# Patient Record
Sex: FEMALE | Race: WHITE | Employment: FULL TIME | URBAN - METROPOLITAN AREA
[De-identification: names, ages, dates, MRNs, and addresses within clinical notes are randomized per-mention and may not be internally consistent; named-entity substitution may affect disease eponyms.]

---

## 2022-01-12 ENCOUNTER — APPOINTMENT (OUTPATIENT)
Dept: GENERAL RADIOLOGY | Age: 37
End: 2022-01-12
Payer: COMMERCIAL

## 2022-01-12 ENCOUNTER — HOSPITAL ENCOUNTER (EMERGENCY)
Age: 37
Discharge: HOME | End: 2022-01-12
Attending: EMERGENCY MEDICINE
Payer: COMMERCIAL

## 2022-01-12 VITALS
HEART RATE: 71 BPM | SYSTOLIC BLOOD PRESSURE: 137 MMHG | RESPIRATION RATE: 16 BRPM | DIASTOLIC BLOOD PRESSURE: 74 MMHG | TEMPERATURE: 97.8 F | OXYGEN SATURATION: 99 %

## 2022-01-12 DIAGNOSIS — S82.65XA CLOSED NONDISPLACED FRACTURE OF LATERAL MALLEOLUS OF LEFT FIBULA, INITIAL ENCOUNTER: Primary | ICD-10-CM

## 2022-01-12 PROCEDURE — 73610 X-RAY EXAM OF ANKLE: CPT

## 2022-01-12 PROCEDURE — 6370000000 HC RX 637 (ALT 250 FOR IP): Performed by: EMERGENCY MEDICINE

## 2022-01-12 PROCEDURE — 99283 EMERGENCY DEPT VISIT LOW MDM: CPT

## 2022-01-12 PROCEDURE — 29515 APPLICATION SHORT LEG SPLINT: CPT

## 2022-01-12 RX ORDER — HYDROCODONE BITARTRATE AND ACETAMINOPHEN 5; 325 MG/1; MG/1
1 TABLET ORAL EVERY 6 HOURS PRN
Qty: 12 TABLET | Refills: 0 | Status: SHIPPED | OUTPATIENT
Start: 2022-01-12 | End: 2022-01-15

## 2022-01-12 RX ORDER — ACETAMINOPHEN 500 MG
1000 TABLET ORAL ONCE
Status: COMPLETED | OUTPATIENT
Start: 2022-01-12 | End: 2022-01-12

## 2022-01-12 RX ADMIN — ACETAMINOPHEN 1000 MG: 500 TABLET ORAL at 15:18

## 2022-01-12 ASSESSMENT — PAIN DESCRIPTION - ORIENTATION: ORIENTATION: LEFT

## 2022-01-12 ASSESSMENT — PAIN DESCRIPTION - LOCATION: LOCATION: ANKLE

## 2022-01-12 ASSESSMENT — PAIN SCALES - GENERAL: PAINLEVEL_OUTOF10: 7

## 2022-01-12 NOTE — ED PROVIDER NOTES
Triage Chief Complaint:   Ankle Pain (Pt was walking, stopped to turn and change direction and felt a \"pop\" to left ankle. Swelling noted to left lateral portion of ankle, throbbing and numb throughout foot. Pain 7/10 at this time. Pt is approx 6 months pregnant. Arrived to room via wheelchair, AOx4, breathing unlabored, skin warm and dry)    [place]:  Barrett Ramirez is a 31 y.o. female that presents with with left ankle pain. Patient was walking and stopped and pivoted to change direction and felt a \"pop\" with immediate pain to the lateral aspect of her left ankle. Patient then began to have some swelling and numbness to the same area. Patient reports pain is 7 out of 10 at this time and more severe with attempting to bear weight. No radiation up the leg into the calf. Patient is stable to wiggle toes albeit with some pain. No fall or head injury. No injury to any other location. Patient is approximately 6 months pregnant with pregnancy going well. ROS:  General:  No fevers, no chills  Respiratory:  No shortness of breath  Neurologic:  No numbness, no weakness  Extremities:  + edema, + pain  Skin:  No rash  Psych: No axienty    No past medical history on file. Past Surgical History:   Procedure Laterality Date    CHOLECYSTECTOMY       No family history on file.   Social History     Socioeconomic History    Marital status: Single     Spouse name: Not on file    Number of children: Not on file    Years of education: Not on file    Highest education level: Not on file   Occupational History    Not on file   Tobacco Use    Smoking status: Not on file    Smokeless tobacco: Not on file   Substance and Sexual Activity    Alcohol use: Not on file    Drug use: Not on file    Sexual activity: Not on file   Other Topics Concern    Not on file   Social History Narrative    Not on file     Social Determinants of Health     Financial Resource Strain:     Difficulty of Paying Living Expenses: Not on file   Food Insecurity:     Worried About Running Out of Food in the Last Year: Not on file    Shaye of Food in the Last Year: Not on file   Transportation Needs:     Lack of Transportation (Medical): Not on file    Lack of Transportation (Non-Medical): Not on file   Physical Activity:     Days of Exercise per Week: Not on file    Minutes of Exercise per Session: Not on file   Stress:     Feeling of Stress : Not on file   Social Connections:     Frequency of Communication with Friends and Family: Not on file    Frequency of Social Gatherings with Friends and Family: Not on file    Attends Episcopal Services: Not on file    Active Member of [address] [place] [address] [employer] or Organizations: Not on file    Attends Club or Organization Meetings: Not on file    Marital Status: Not on file   Intimate Partner Violence:     Fear of Current or Ex-Partner: Not on file    Emotionally Abused: Not on file    Physically Abused: Not on file    Sexually Abused: Not on file   Housing Stability:     Unable to Pay for Housing in the Last Year: Not on file    Number of [place] in the Last Year: Not on file    Unstable Housing in the Last Year: Not on file     No current facility-administered medications for this encounter. Current Outpatient Medications   Medication Sig Dispense Refill    Prenatal MV-Min-Fe Fum-FA-DHA (PRENATAL 1 PO) Take by mouth      HYDROcodone-acetaminophen (NORCO) 5-325 MG per tablet Take 1 tablet by mouth every 6 hours as needed for Pain for up to 3 days. Intended supply: 3 days. Take lowest dose possible to manage pain 12 tablet 0     No Known Allergies    Nursing Notes Reviewed    Physical Exam:  ED Triage Vitals [01/12/22 1500]   Enc Vitals Group      /74      Pulse 71      Resp 16      Temp 97.8 °F (36.6 °C)      Temp Source Infrared      SpO2 99 %      Weight       Height       Head Circumference       Peak Flow       Pain Score       Pain Loc       Pain Edu? Excl. in [address]?         My pulse ox interpretation is - normal    General appearance:  No acute distress. Sitting comfortably in bed. Skin:  Warm. Dry. No open wounds or sores to the affected ankle. Some developing contusion to the lateral left ankle. Eye:  Extraocular movements intact. Ears, nose, mouth and throat:  Oral mucosa moist   Extremity:  No swelling other than the left ankle. Normal ROM of the left ankle. LLE: No gross deformity of the left lower extremity with obvious edema and swelling about the lateral aspect of the left ankle just below the lateral malleolus. Normal range of motion of left hip and left knee. Limited range of motion of left ankle secondary to pain. Wiggles toes without difficulty. Extensor mechanism is intact. Achilles is intact. No pain with calf squeezing or tenderness at the fibular head or with patellar blotting. No tenderness palpation along the medial malleolus or the base of the fifth metatarsal.  Strong DP and PT pulses. Left foot is well-perfused. There are some altered sensation to light touch to the lateral left foot where it is most swollen but patient is able to feel normal light touch to all toes. Heart:  Strong and symmetric peripheral pulses. Extremities are well perfused. Abdomen:  Non-distended. Respiratory:  Respirations nonlabored. Neurological:  Alert and oriented times 3. No focal neuro deficits. Sensation intact to light touch to distal upper/lower extremities; 5/5 and symmetric  and dorsi/plantar flexion. I have reviewed and interpreted all of the currently available lab results from this visit (if applicable):  No results found for this visit on 01/12/22. Radiographs (if obtained):  [] The following radiograph was interpreted by myself in the absence of a radiologist:   [x] Radiologist's Report Reviewed:  XR ANKLE LEFT (MIN 3 VIEWS)   Final Result   Transversely oriented minimally displaced fracture of the tip of the lateral   malleolus. EKG (if obtained): (All EKG's are interpreted by myself in the absence of a cardiologist)    Chart review shows recent radiographs:  No results found. MDM:  Pt presents as above. Emergent conditions considered. Presentation prompted initial x-rays as above. X-rays are demonstrating transversely oriented minimally displaced fracture of the tip of the lateral malleolus. Patient is placed in a short leg splint with footplate. After risk-benefit conversation patient will be provided a short course of Norco to be used only when pain is more severe. Scheduled Tylenol otherwise for pain. Orthopedic follow-up is discussed and patient is planning on calling to setting that up. We unfortunately do not have any crutches and I did encourage patient to call around to various pharmacies and friends to see who might have a pair to be used as it would like patient to be nonweightbearing to this leg. Splint care is discussed. RICE discussed. I discussed specific signs and symptoms and when to return to the emergency department as well as need for close outpatient follow-up. Questions sought and answered with the patient and significant other. They voice understanding and agree with plan. Care of this patient did occur during the COVID-19 pandemic. Clinical Impression:  1. Closed nondisplaced fracture of lateral malleolus of left fibula, initial encounter      Disposition referral (if applicable):  DO Natali Benson 3968 [phone]    Schedule an appointment as soon as possible for a visit       Your Orthopedic Surgeon    Schedule an appointment as soon as possible for a visit       Disposition medications (if applicable):  Discharge Medication List as of 1/12/2022  4:06 PM      START taking these medications    Details   HYDROcodone-acetaminophen (NORCO) 5-325 MG per tablet Take 1 tablet by mouth every 6 hours as needed for Pain for up to 3 days.  Intended supply: 3 days. Take lowest dose possible to manage pain, Disp-12 tablet, R-0Normal             Comment: Please note this report has been produced using speech recognition software and may contain errors related to that system including errors in grammar, punctuation, and spelling, as well as words and phrases that may be inappropriate. If there are any questions or concerns please feel free to contact the dictating provider for clarification.          Xenia Mckenna MD  01/12/22 2654

## 2022-01-18 ENCOUNTER — OFFICE VISIT (OUTPATIENT)
Dept: ORTHOPEDIC SURGERY | Age: 37
End: 2022-01-18
Payer: COMMERCIAL

## 2022-01-18 VITALS
OXYGEN SATURATION: 98 % | HEIGHT: 60 IN | WEIGHT: 220 LBS | RESPIRATION RATE: 16 BRPM | HEART RATE: 101 BPM | BODY MASS INDEX: 43.19 KG/M2

## 2022-01-18 DIAGNOSIS — S82.839A AVULSION FRACTURE OF DISTAL END OF FIBULA: Primary | ICD-10-CM

## 2022-01-18 PROCEDURE — G8419 CALC BMI OUT NRM PARAM NOF/U: HCPCS | Performed by: STUDENT IN AN ORGANIZED HEALTH CARE EDUCATION/TRAINING PROGRAM

## 2022-01-18 PROCEDURE — 4004F PT TOBACCO SCREEN RCVD TLK: CPT | Performed by: STUDENT IN AN ORGANIZED HEALTH CARE EDUCATION/TRAINING PROGRAM

## 2022-01-18 PROCEDURE — G8427 DOCREV CUR MEDS BY ELIG CLIN: HCPCS | Performed by: STUDENT IN AN ORGANIZED HEALTH CARE EDUCATION/TRAINING PROGRAM

## 2022-01-18 PROCEDURE — G8484 FLU IMMUNIZE NO ADMIN: HCPCS | Performed by: STUDENT IN AN ORGANIZED HEALTH CARE EDUCATION/TRAINING PROGRAM

## 2022-01-18 PROCEDURE — 99203 OFFICE O/P NEW LOW 30 MIN: CPT | Performed by: STUDENT IN AN ORGANIZED HEALTH CARE EDUCATION/TRAINING PROGRAM

## 2022-01-18 ASSESSMENT — ENCOUNTER SYMPTOMS
WHEEZING: 0
COLOR CHANGE: 1
NAUSEA: 0
SHORTNESS OF BREATH: 0
SORE THROAT: 0
COUGH: 0
BACK PAIN: 0
VOICE CHANGE: 0
VOMITING: 0

## 2022-01-18 NOTE — PROGRESS NOTES
1/18/2022   Chief Complaint   Patient presents with    Fracture     left distal fibula        History of Present Illness:                             Ramon Barrera is a 31 y.o. female  works at a car dealership,  referred by emergency room for evaluation and treatment of left ankle pain. Patient states that she was ambulating approximately 6 days prior when she stopped to turn and felt a pop on the lateral aspect of her left ankle. She had immediate pain and this caused her to fall. She was able to ambulate although this was extremely painful. She was brought to the emergency room to be evaluated. X-rays were taken at that time which demonstrated a minimally displaced avulsion fracture at the tip of the distal fibula. She was placed in a splint and made nonweightbearing and told to follow-up with orthopedics. She is here today for first visit with myself. She denies any previous left ankle pain. Patient is 6 months pregnant at this time. She has no other areas of complaint currently. The pain's location is ATFL origin of fibula tip. she describes the symptoms as aching, sharp and stabbing. Symptoms improve with rest. Symptoms worsen with ankle plantarflexion/dorsiflexion or inversion, weight bearing (especially stair climbing), twisting activities. Patient states she denies having sensation of instability, with minimally decreased ROM    Treatment to date has been ice, NSAID, splinting with mild relief. Patient denies prior injury to ankle, denies numbness, tingling, fever, chills. Is affecting ADLs. Pain is 5/10 at it's worst.    Patient has been nonweightbearing and using a knee scooter to help with this. Outside reports reviewed: Emergency room note and imaging reviewed    Patient's medications, allergies, past medical, surgical, social and family histories were reviewed and updated as appropriate. MA HPI: Patient is a 31year old female.  Patient is in the office today with left distal fibula. Patient states that he/she injured themselves by loosing her balance while walking and turning and fell. Patient states that the injury happened 1/12/22. She was seen in the ED on 1/12/22. She came in wearing the splint from the ED and using a knee scooter. She has been taking tylenol for pain. She is pregnant. Pain scale  5/10. Occupation: car dealership      Medical History  Patient's medications, allergies, past medical, surgical, social and family histories were reviewed and updated as appropriate. History reviewed. No pertinent past medical history. Past Surgical History:   Procedure Laterality Date    CHOLECYSTECTOMY       History reviewed. No pertinent family history. Social History     Socioeconomic History    Marital status: Single     Spouse name: None    Number of children: None    Years of education: None    Highest education level: None   Occupational History    None   Tobacco Use    Smoking status: None    Smokeless tobacco: None   Substance and Sexual Activity    Alcohol use: None    Drug use: None    Sexual activity: None   Other Topics Concern    None   Social History Narrative    None     Social Determinants of Health     Financial Resource Strain:     Difficulty of Paying Living Expenses: Not on file   Food Insecurity:     Worried About Running Out of Food in the Last Year: Not on file    Shaye of Food in the Last Year: Not on file   Transportation Needs:     Lack of Transportation (Medical): Not on file    Lack of Transportation (Non-Medical):  Not on file   Physical Activity:     Days of Exercise per Week: Not on file    Minutes of Exercise per Session: Not on file   Stress:     Feeling of Stress : Not on file   Social Connections:     Frequency of Communication with Friends and Family: Not on file    Frequency of Social Gatherings with Friends and Family: Not on file    Attends Orthodoxy Services: Not on file   [employer] or Organizations: Not on file    Attends Club or Organization Meetings: Not on file    Marital Status: Not on file   Intimate Partner Violence:     Fear of Current or Ex-Partner: Not on file    Emotionally Abused: Not on file    Physically Abused: Not on file    Sexually Abused: Not on file   Housing Stability:     Unable to Pay for Housing in the Last Year: Not on file    Number of [place] in the Last Year: Not on file    Unstable Housing in the Last Year: Not on file     Current Outpatient Medications   Medication Sig Dispense Refill    Prenatal MV-Min-Fe Fum-FA-DHA (PRENATAL 1 PO) Take by mouth       No current facility-administered medications for this visit. No Known Allergies      Review of Systems   Constitutional: Positive for activity change. Negative for fatigue and fever. HENT: Negative for sneezing, sore throat and voice change. Respiratory: Negative for cough, shortness of breath and wheezing. Cardiovascular: Negative for leg swelling. Gastrointestinal: Negative for nausea and vomiting. Musculoskeletal: Positive for gait problem, joint swelling and myalgias. Negative for arthralgias, back pain, neck pain and neck stiffness. Skin: Positive for color change. Negative for rash and wound. Neurological: Negative for weakness and numbness. Psychiatric/Behavioral: Negative for behavioral problems, confusion and self-injury. Examination:  General Exam:  Vitals: LMP  (LMP Unknown)    Physical Exam  Constitutional:       General: She is not in acute distress. Appearance: Normal appearance. She is not ill-appearing. Eyes:      General:         Right eye: No discharge. Left eye: No discharge. Extraocular Movements: Extraocular movements intact. Cardiovascular:      Rate and Rhythm: Normal rate. Pulmonary:      Effort: Pulmonary effort is normal. No respiratory distress. Breath sounds: No wheezing. Musculoskeletal:         General: Swelling, tenderness and signs of injury present. No deformity. Right knee: Normal.      Left knee: Normal.      Right lower leg: Normal. No edema. Left lower leg: Normal. No edema. Right ankle: Normal.      Right Achilles Tendon: Normal.      Left ankle: Swelling and ecchymosis present. No deformity or lacerations. Tenderness present over the lateral malleolus, ATF ligament and CF ligament. No medial malleolus tenderness. Decreased range of motion. Anterior drawer test negative. Normal pulse. Left Achilles Tendon: Normal.      Right foot: Normal.      Left foot: Normal.   Skin:     General: Skin is warm and dry. Capillary Refill: Capillary refill takes less than 2 seconds. Neurological:      General: No focal deficit present. Mental Status: She is alert and oriented to person, place, and time. Sensory: No sensory deficit. Coordination: Coordination normal.      Gait: Gait normal.   Psychiatric:         Mood and Affect: Mood normal.         Behavior: Behavior normal.        LEFT Foot and Ankle Exam      INSPECTION: ALIGNMENT:    Gait: Not tested at this time     Alignment:     Hindfoot: Normal       Midfoot: Normal     Forefoot: Normal    Scars: None   Color: Minimal ecchymosis over distal fibula    Swelling: Mild over distal fibula    Atrophy: None Collective     Heel / Toe Walking: Unable to perform at this time    Ankle-Hindfoot Alignment:    Good -plantigrade (PG), well aligned      TENDERNESS:    Sinus tarsi: None   Anteromedial joint line: none    Syndesmosis: none   Anterolateral joint line: none    ATFL: +   PTFL: Neg      CFL: +       Talonavicular: none     Anterior tibialis: none    Anterolateral gutter: none  Extensor tendons: none    Fibula: Pain distally only    Peroneal tendons: none     Peroneal tubercle.  None     Medial/lateral achilles: none    Medial/lateral achilles insertion: none      Deltoid: none        Medial fracture    P:   I had a thorough discussion with the patient regarding her left ankle injury and treatment course. I explained that although that she does have a fracture this is more of a avulsion type injury and we will treat this more as a ankle sprain. Patient was placed in a walking boot and at this time can be weightbearing as tolerated as long as she remains nonpainful. We will start her off at 50% weightbearing and she can increase her weightbearing 25% each day if she remains nonpainful. If she does have pain with increased weightbearing she will maintain her level for the day and will not progress and will try again the following day. She should continue using the knee scooter as needed as well. She will be off work for 1 week and a note was provided. She will continue to ice and use over-the-counter pain medication although this may be difficult with her being pregnant. She will do no range of motion exercises at this time but we will likely begin these at her follow-up visit in 1 month. We will likely not do any type of imaging at that time unless patient has a new injury, due to the fact the patient is pregnant. At her next visit we will attempt to progress her to a lace up ankle brace. All questions were answered and patient voiced understanding.     Electronically signed by Grecia Knott DO on 1/18/2022 at 8:52 AM

## 2022-01-18 NOTE — PATIENT INSTRUCTIONS
Cam boot given in office  Please wear cam boot when you are up and walking  May take the boot of for range of motion, hygiene, and rest/sleep  Return to work to in one week with restrictions of no stairs, may take breaks as needed, and may use assist walking devices  May start to progress weightbearing as tolerated  Continue range of motion  Ice and elevate as needed for pain and swelling  Tylenol or Motrin for pain  Follow up in one month

## 2022-01-18 NOTE — PROGRESS NOTES
Patient is a 31year old female. Patient is in the office today with left distal fibula. Patient states that he/she injured themselves by loosing her balance while walking and turning and fell. Patient states that the injury happened 1/12/22. She was seen in the ED on 1/12/22. She came in wearing the splint from the ED and using a knee scooter. She has been taking tylenol for pain. She is pregnant. Pain scale  5/10.   Occupation: car dealership  Dominant Hand: n/a

## 2022-01-21 NOTE — LETTER
Jennifer WILEY [place] discharged to home accompanied by family member.   Patient provided with the following educational materials upon discharge:  [facility] .   Valuables and belongings sent with patient.   discharge summary, discharge instructions, medications and follow up appointments reviewed with patient. Patient and  verbalized understanding   [facility]  [address] [facility] [address]  Phone: [phone]  Fax: [phone]    Grecia Knott DO        January 18, 2022     Patient: Lani Bates   YOB: 1985   Date of Visit: 1/18/2022       To Whom it May Concern:    Lani Bates was seen in my clinic on 1/18/2022. She may return to work on 01/25/2022 with restrictions of no stairs, may take breaks as needed, and may use assist walking devices while ambulating. If you have any questions or concerns, please don't hesitate to call.     Sincerely,         Grecia Knott DO

## 2022-02-21 ENCOUNTER — HOSPITAL ENCOUNTER (OUTPATIENT)
Dept: GENERAL RADIOLOGY | Age: 37
Discharge: HOME | End: 2022-02-21
Payer: COMMERCIAL

## 2022-02-21 ENCOUNTER — HOSPITAL ENCOUNTER (OUTPATIENT)
Age: 37
Discharge: HOME | End: 2022-02-21
Payer: COMMERCIAL

## 2022-02-21 DIAGNOSIS — M25.572 LEFT ANKLE PAIN, UNSPECIFIED CHRONICITY: ICD-10-CM

## 2022-02-21 PROCEDURE — 73610 X-RAY EXAM OF ANKLE: CPT

## 2022-02-22 ENCOUNTER — OFFICE VISIT (OUTPATIENT)
Dept: ORTHOPEDIC SURGERY | Age: 37
End: 2022-02-22
Payer: COMMERCIAL

## 2022-02-22 VITALS
BODY MASS INDEX: 44.57 KG/M2 | HEIGHT: 60 IN | HEART RATE: 82 BPM | WEIGHT: 227 LBS | OXYGEN SATURATION: 97 % | RESPIRATION RATE: 16 BRPM

## 2022-02-22 DIAGNOSIS — S82.839A AVULSION FRACTURE OF DISTAL END OF FIBULA: Primary | ICD-10-CM

## 2022-02-22 PROCEDURE — G8419 CALC BMI OUT NRM PARAM NOF/U: HCPCS | Performed by: STUDENT IN AN ORGANIZED HEALTH CARE EDUCATION/TRAINING PROGRAM

## 2022-02-22 PROCEDURE — 99213 OFFICE O/P EST LOW 20 MIN: CPT | Performed by: STUDENT IN AN ORGANIZED HEALTH CARE EDUCATION/TRAINING PROGRAM

## 2022-02-22 PROCEDURE — G8484 FLU IMMUNIZE NO ADMIN: HCPCS | Performed by: STUDENT IN AN ORGANIZED HEALTH CARE EDUCATION/TRAINING PROGRAM

## 2022-02-22 PROCEDURE — 4004F PT TOBACCO SCREEN RCVD TLK: CPT | Performed by: STUDENT IN AN ORGANIZED HEALTH CARE EDUCATION/TRAINING PROGRAM

## 2022-02-22 PROCEDURE — G8428 CUR MEDS NOT DOCUMENT: HCPCS | Performed by: STUDENT IN AN ORGANIZED HEALTH CARE EDUCATION/TRAINING PROGRAM

## 2022-02-22 ASSESSMENT — ENCOUNTER SYMPTOMS
BACK PAIN: 0
SORE THROAT: 0
VOICE CHANGE: 0
COLOR CHANGE: 0
COUGH: 0
VOMITING: 0
SHORTNESS OF BREATH: 0
NAUSEA: 0
WHEEZING: 0

## 2022-02-22 NOTE — PROGRESS NOTES
Pt comes in today for a follow up of a left lateral malleolus fx. She was last seen in our office on 1/18/22. She is currently ambulating without the boot. She states that she is able to complete her ADLs without pain. She does state that she feels unstable sometimes, like \"it wants to roll again\". She rates her pain at 0/10 currently. She has been doing ROM exercises. X-rays were taken on 1/21/22.

## 2022-02-22 NOTE — PATIENT INSTRUCTIONS
Ankle exercises given today  Follow up as needed. Patient Education        Ankle Fracture: Rehab Exercises  Introduction  Here are some examples of exercises for you to try. The exercises may be suggested for a condition or for rehabilitation. Start each exercise slowly. Ease off the exercises if you start to have pain. You will be told when to start these exercises and which ones will work best for you. How to do the exercises  Calf stretch (knee straight)    For this exercise, you will need a towel. 1. Sit with your affected leg straight and supported on the floor. Your other leg should be bent, with that foot flat on the floor. 2. Place a towel around your affected foot just under the toes. 3. Hold one end of the towel in each hand, with your hands above your knees. 4. Pull back gently with the towel so that your foot stretches toward you. 5. Hold the position for at least 15 to 30 seconds. 6. Repeat 2 to 4 times a session, up to 5 sessions a day. Calf stretch (knee bent)    For this exercise, you will need a towel. You will also need a pillow or foam roll. 1. Sit with your affected leg straight and supported on the floor. Your other leg should be bent, with that foot flat on the floor. 2. Place a pillow or foam roll under your affected leg. 3. Place a towel around your affected foot just under the toes. 4. Hold one end of the towel in each hand, with your hands above your knees. 5. Pull back gently with the towel so that your foot stretches toward you. 6. Hold the position for at least 15 to 30 seconds. 7. Repeat 2 to 4 times a session, up to 5 sessions a day. Ankle plantar flexion    1. Sit with your affected leg straight and supported on the floor. Your other leg should be bent, with that foot flat on the floor. 2. Keeping your affected leg straight, gently flex your foot downward so your toes are pointed away from your body.  Then slowly relax your foot to the starting position. 3. Repeat 8 to 12 times. Ankle dorsiflexion    1. Sit with your affected leg straight and supported on the floor. Your other leg should be bent, with that foot flat on the floor. 2. Keeping your affected leg straight, gently flex your foot back toward your body so your toes point upward. Then slowly relax your foot to the starting position. 3. Repeat 8 to 12 times. Resisted ankle plantar flexion    For the next four exercises, you will need elastic exercise material, such as surgical tubing or Thera-Band. 1. Sit with your affected leg straight and supported on the floor. Your other leg should be bent, with that foot flat on the floor. 2. Place an elastic band around your affected foot just under the toes. 3. Hold each end of the band in each hand, with your hands above your knees. 4. Keeping your affected leg straight, gently flex your foot downward so your toes are pointed away from your body. Then slowly relax your foot to the starting position. 5. Repeat 8 to 12 times. Resisted ankle dorsiflexion    1. Tie the ends of an exercise band together to form a loop. Attach one end of the loop to a secure object, like a table leg, or shut a door on it to hold it in place. (Or you can have someone hold one end of the loop to provide resistance.)  2. While sitting on the floor or in a chair, loop the other end of the band over the top of your affected foot. 3. Keeping your knee and leg straight, slowly flex your foot toward you to pull back on the exercise band, and then slowly relax. 4. Repeat 8 to 12 times. Resisted ankle inversion    1. Sit on the floor with your good leg crossed over your other leg. 2. Hold both ends of an exercise band and loop the band around the inside of your affected foot. Then press your good foot against the band. 3. Keeping your legs crossed, slowly push your affected foot against the band so that foot moves away from your good foot. Then slowly relax.   4. Repeat 8 to 12 times. Resisted ankle eversion    1. Sit on the floor with your legs straight. 2. Hold both ends of an exercise band and loop the band around the outside of your affected foot. Then press your good foot against the band. 3. Keeping your leg straight, slowly push your affected foot outward against the band and away from your good foot without letting your leg rotate. Then slowly relax. 4. Repeat 8 to 12 times. Ankle alphabet    1. Sit in a chair with your feet flat on the floor. (You can also do this exercise lying on your back with your affected leg propped up on a pillow). 2. Lift the heel of your affected foot off the floor, and slowly trace the letters of the alphabet. Heel raises    1. Stand with your feet a few inches apart, with your hands lightly resting on a counter or chair in front of you. 2. Slowly raise your heels off the floor while keeping your knees straight. 3. Hold for about 6 seconds, then slowly lower your heels to the floor. 4. Do 8 to 12 repetitions several times during the day. Follow-up care is a key part of your treatment and safety. Be sure to make and go to all appointments, and call your doctor if you are having problems. It's also a good idea to know your test results and keep a list of the medicines you take. Where can you learn more? Go to https://Trunk ClubpepicQUIQeb.[email or web address]. org and sign in to your [employer] account. Enter T800 in the [facility] box to learn more about \"Ankle Fracture: Rehab Exercises. \"     If you do not have an account, please click on the \"Sign Up Now\" link. Current as of: July 1, 2021               Content Version: 13.1  © 2006-2021 Healthwise, Incorporated. Care instructions adapted under license by [facility] ([facility]). If you have questions about a medical condition or this instruction, always ask your healthcare professional. [address] any warranty or liability for your use of this information.     Patient Education

## 2022-02-22 NOTE — PROGRESS NOTES
2/22/2022   Chief Complaint   Patient presents with    Fracture     left lateral malleolus      Updated HPI: Patient is here for reevaluation of her left ankle. Patient states that her pain is significantly improved and she did use the walking boot for several weeks without issue. She has no complaints at this time and states she completes all of her ADLs without issue or pain. However she still states that she has some sensation of decreased strength and sensation of the ankle wanting to invert. No new complaints this time. Previoius HPI (1/18/2022):                             Helen Tyler is a 31 y.o. female  works at a car dealership,  referred by emergency room for evaluation and treatment of left ankle pain. Patient states that she was ambulating approximately 6 days prior when she stopped to turn and felt a pop on the lateral aspect of her left ankle. She had immediate pain and this caused her to fall. She was able to ambulate although this was extremely painful. She was brought to the emergency room to be evaluated. X-rays were taken at that time which demonstrated a minimally displaced avulsion fracture at the tip of the distal fibula. She was placed in a splint and made nonweightbearing and told to follow-up with orthopedics. She is here today for first visit with myself. She denies any previous left ankle pain. Patient is 6 months pregnant at this time. She has no other areas of complaint currently. The pain's location is ATFL origin of fibula tip. she describes the symptoms as aching, sharp and stabbing. Symptoms improve with rest. Symptoms worsen with ankle plantarflexion/dorsiflexion or inversion, weight bearing (especially stair climbing), twisting activities. Patient states she denies having sensation of instability, with minimally decreased ROM    Treatment to date has been ice, NSAID, splinting with mild relief.       Patient denies prior injury to ankle, denies numbness, tingling, fever, chills. Is affecting ADLs. Pain is 5/10 at it's worst.    Patient has been nonweightbearing and using a knee scooter to help with this. Outside reports reviewed: Emergency room note and imaging reviewed    Patient's medications, allergies, past medical, surgical, social and family histories were reviewed and updated as appropriate. MA HPI: Patient is a 31year old female. Patient is in the office today with left distal fibula. Patient states that he/she injured themselves by loosing her balance while walking and turning and fell. Patient states that the injury happened 1/12/22. She was seen in the ED on 1/12/22. She came in wearing the splint from the ED and using a knee scooter. She has been taking tylenol for pain. She is pregnant. Pain scale  5/10. Occupation: car dealership      Medical History  Patient's medications, allergies, past medical, surgical, social and family histories were reviewed and updated as appropriate. No past medical history on file. Past Surgical History:   Procedure Laterality Date    CHOLECYSTECTOMY       No family history on file.   Social History     Socioeconomic History    Marital status: Single     Spouse name: Not on file    Number of children: Not on file    Years of education: Not on file    Highest education level: Not on file   Occupational History    Not on file   Tobacco Use    Smoking status: Not on file    Smokeless tobacco: Not on file   Substance and Sexual Activity    Alcohol use: Not on file    Drug use: Not on file    Sexual activity: Not on file   Other Topics Concern    Not on file   Social History Narrative    Not on file     Social Determinants of Health     Financial Resource Strain:     Difficulty of Paying Living Expenses: Not on file   Food Insecurity:     Worried About Running Out of Food in the Last Year: Not on file    Shaye of Food in the Last Year: Not on file   Transportation Needs:     Lack of Transportation (Medical): Not on file    Lack of Transportation (Non-Medical): Not on file   Physical Activity:     Days of Exercise per Week: Not on file    Minutes of Exercise per Session: Not on file   Stress:     Feeling of Stress : Not on file   Social Connections:     Frequency of Communication with Friends and Family: Not on file    Frequency of Social Gatherings with Friends and Family: Not on file    Attends Church Services: Not on file    Active Member of [address] [place] [address] or Organizations: Not on file    Attends Club or Organization Meetings: Not on file    Marital Status: Not on file   Intimate Partner Violence:     Fear of Current or Ex-Partner: Not on file    Emotionally Abused: Not on file    Physically Abused: Not on file    Sexually Abused: Not on file   Housing Stability:     Unable to Pay for Housing in the Last Year: Not on file    Number of [place] in the Last Year: Not on file    Unstable Housing in the Last Year: Not on file     Current Outpatient Medications   Medication Sig Dispense Refill    Prenatal MV-Min-Fe Fum-FA-DHA (PRENATAL 1 PO) Take by mouth       No current facility-administered medications for this visit. No Known Allergies      Review of Systems   Constitutional: Negative for activity change, fatigue and fever. HENT: Negative for sneezing, sore throat and voice change. Respiratory: Negative for cough, shortness of breath and wheezing. Cardiovascular: Negative for leg swelling. Gastrointestinal: Negative for nausea and vomiting. Musculoskeletal: Negative for arthralgias, back pain, gait problem, joint swelling, myalgias, neck pain and neck stiffness. Skin: Negative for color change, rash and wound. Neurological: Negative for weakness and numbness. Psychiatric/Behavioral: Negative for behavioral problems, confusion and self-injury.                                                    Examination:  General Exam:  Vitals: Pulse 82   Resp 16   Ht 5' (1.524 m)   Wt 227 lb (103 kg)   LMP  (LMP Unknown)   SpO2 97%   BMI 44.33 kg/m²    Physical Exam  Constitutional:       General: She is not in acute distress. Appearance: Normal appearance. She is not ill-appearing. Eyes:      General:         Right eye: No discharge. Left eye: No discharge. Extraocular Movements: Extraocular movements intact. Cardiovascular:      Rate and Rhythm: Normal rate. Pulmonary:      Effort: Pulmonary effort is normal. No respiratory distress. Breath sounds: No wheezing. Musculoskeletal:         General: Signs of injury present. No swelling, tenderness or deformity. Right knee: Normal.      Left knee: Normal.      Right lower leg: Normal. No edema. Left lower leg: Normal. No edema. Right ankle: Normal.      Right Achilles Tendon: Normal.      Left ankle: No swelling, deformity, ecchymosis or lacerations. No tenderness. No medial malleolus tenderness. Normal range of motion. Anterior drawer test negative. Normal pulse. Left Achilles Tendon: Normal.      Right foot: Normal.      Left foot: Normal.   Skin:     General: Skin is warm and dry. Capillary Refill: Capillary refill takes less than 2 seconds. Neurological:      General: No focal deficit present. Mental Status: She is alert and oriented to person, place, and time. Sensory: No sensory deficit.       Coordination: Coordination normal.      Gait: Gait normal.   Psychiatric:         Mood and Affect: Mood normal.         Behavior: Behavior normal.        LEFT Foot and Ankle Exam      INSPECTION: ALIGNMENT:    Gait: Performed without pain    Alignment:     Hindfoot: Normal       Midfoot: Normal     Forefoot: Normal    Scars: None   Color: M normal    Swelling: None    Atrophy: None Collective     Heel / Toe Walking: Performed on issue    Ankle-Hindfoot Alignment:    Good -plantigrade (PG), well aligned      TENDERNESS:    Sinus tarsi: None   Anteromedial joint line: none    Syndesmosis: none   Anterolateral joint line: none    ATFL: Negative  PTFL: Neg      CFL: Negative      Talonavicular: none     Anterior tibialis: none    Anterolateral gutter: none  Extensor tendons: none    Fibula: No pain    Peroneal tendons: none     Peroneal tubercle. None     Medial/lateral achilles: none    Medial/lateral achilles insertion: none      Deltoid: none        Medial Malleolus: none  Retrocalcaneal: none    PTT: none    Medial achilles: none    Navicular: none   Lateral achilles: none    Calcaneal tuberosity: none        Calcaneal cuboid: none  MT / MT heads: none     Navicular: none   Medial cord origin PF: none    Cuneiforms: none   Web space: none    Lisfranc none        Base of the fifth metatarsal: none           RANGE OF MOTION:  RIGHT  LEFT    STRENGTH: (Left) (* = pain)     Ankle DF/PF:    15/45   15/45    Anterior tibialis: 5/5    Eversion/Inversion:   15/25   15/25   Posterior tibialis: 5/5    Midfoot ABD/ADD:   10/10   10/10   Gastroc-soleus: 5/5    First MTP DF/PF:   60/25   60/25   Peroneals: 5/5    EHL: 5/5            FHL: 5/5        SPECIAL TESTS:    Anterior drawer:  Grade 1      (C-W contralateral side)     Inversion: 30°     Eversion 10°      Collective Instability: (Ant-post and varus-valgus)    Stable      PROVOCATIVE TESTING:    Forced DF/ER: No pain at syndesmosis. Mid-leg squeeze No pain at syndesmosis    Forced DF: No pain anterior joint line. Forced PF: No pain posterior ankle. Forced INV: No pain present laterally    Forced EV: No pain medial         NEUROLOGIC TESTING:    All dermatomes foot, ankle and leg have normal sensation light touch    Ankle Reflexes 2+, symmetric     Negative Babinski and No Clonus      VASCULAR:  2+ pulses PT/DT with brisk capillary refill toes. Diagnostic testing:  3 views of left ankle demonstrate previously seen transverse fracture at the tip lateral malleolus with minimal change in displacement from previous imaging. Some bone resorption seen at fracture site. No additional osseous or soft tissue abnormality seen. Improved soft tissue swelling. Alignment is appropriate throughout. Previous imaging:  X-ray images were reviewed by myself and discussed with the patient:  3 views of left ankle from emergency room demonstrate: There is transversely oriented minimally displaced fracture of the tip of the   lateral malleolus.  The distal tibia appears intact.  The talar dome appears   intact.  No radiopaque foreign body is seen.  There is moderate lateral ankle   soft tissue swelling. Office Procedures:  No orders of the defined types were placed in this encounter. Assessment and Plan    A: Left ankle distal fibula avulsion fracture    P:   I had a thorough conversation with the patient regarding her left ankle injury. At this time she appears to be hearing well and is relatively asymptomatic. She was given a new exercise program to work on proprioceptive training and strengthening of the ankle. She was also given a lace up ankle brace to use when ambulating on uneven ground such as hiking to prevent any type of injury otherwise I want her to work on ambulating without any kind of brace to strengthen the ankle. She will continue to ice as needed. She will follow-up as needed. She is weightbearing and range of motion as tolerated. All questions were answered and patient voiced understanding.     Electronically signed by Crescencio Burton DO on 2/22/2022 at 9:55 AM

## 2022-11-18 ENCOUNTER — TELEPHONE (OUTPATIENT)
Dept: FAMILY MEDICINE CLINIC | Age: 37
End: 2022-11-18

## 2022-11-18 ENCOUNTER — TELEMEDICINE (OUTPATIENT)
Dept: PRIMARY CARE CLINIC | Age: 37
End: 2022-11-18
Payer: COMMERCIAL

## 2022-11-18 DIAGNOSIS — B34.9 VIRAL ILLNESS: Primary | ICD-10-CM

## 2022-11-18 PROCEDURE — G8417 CALC BMI ABV UP PARAM F/U: HCPCS | Performed by: STUDENT IN AN ORGANIZED HEALTH CARE EDUCATION/TRAINING PROGRAM

## 2022-11-18 PROCEDURE — G8484 FLU IMMUNIZE NO ADMIN: HCPCS | Performed by: STUDENT IN AN ORGANIZED HEALTH CARE EDUCATION/TRAINING PROGRAM

## 2022-11-18 PROCEDURE — 99213 OFFICE O/P EST LOW 20 MIN: CPT | Performed by: STUDENT IN AN ORGANIZED HEALTH CARE EDUCATION/TRAINING PROGRAM

## 2022-11-18 PROCEDURE — 4004F PT TOBACCO SCREEN RCVD TLK: CPT | Performed by: STUDENT IN AN ORGANIZED HEALTH CARE EDUCATION/TRAINING PROGRAM

## 2022-11-18 PROCEDURE — G8427 DOCREV CUR MEDS BY ELIG CLIN: HCPCS | Performed by: STUDENT IN AN ORGANIZED HEALTH CARE EDUCATION/TRAINING PROGRAM

## 2022-11-18 ASSESSMENT — ENCOUNTER SYMPTOMS
NAUSEA: 0
COUGH: 1
VOMITING: 0

## 2022-11-18 NOTE — TELEPHONE ENCOUNTER
----- Message from Bull Real sent at 11/18/2022 10:33 AM EST -----  Subject: Appointment Request    Reason for Call: New Patient/New to Provider Appointment needed:   Semi-Routine Cough, Cold Symptoms    QUESTIONS    Reason for appointment request? No appointments available during search     Additional Information for Provider? Pt would like a call back to be   scheduled for a new pt appt, screened red, flu like symptoms with dr. Jb Valle, no appts available, I scheduled with another office, but she would   prefer your office.  pt needs to update insurance at time of appt.   ---------------------------------------------------------------------------  --------------  [id number]  [phone]; OK to leave message on voicemail  ---------------------------------------------------------------------------  --------------  SCRIPT ANSWERS  COVID Screen: Red

## 2022-11-18 NOTE — LETTER
[facility]  [address] [place] [id number]  Phone: [phone]  Fax: [phone]    Bernard Marquez MD        November 18, 2022     Patient: Pavan Madrigal   YOB: 1985   Date of Visit: 11/18/2022       To Whom It May Concern:    Pavan Madrigal was seen in my office today, 11/18/2022. It is my medical opinion that Pavan Madrigal should remain out of work until Wednesday 11/23/2022. .    If you have any questions or concerns, please don't hesitate to call.     Sincerely,        Bernard Marquez MD

## 2022-11-18 NOTE — PROGRESS NOTES
Vita Padilla (:  1985) is a 30 y.o. female,New patient, here for evaluation of the following chief complaint(s): Congestion (Pt is c/o running nose and congestion ), Generalized Body Aches, Other (Pt family was diagnosed with the flu. Pt Is breastfeeding ), and Cough (Pt is c/o cough x 1 day)         ASSESSMENT/PLAN:  1. Viral illness  Patient's symptoms are likely from viral illness, possibly influenza (considering 's diagnosis). Supportive care was recommended. Patient was also recommended to get tested negative and if positive, she would be a candidate for Tamiflu. Return if symptoms worsen or fail to improve. SUBJECTIVE/OBJECTIVE:  Patient presents a visit with complaints of myalgia. Generalized Body Aches  This is a new problem. The current episode started yesterday. The problem occurs constantly. The problem has been gradually worsening. Associated symptoms include congestion, coughing (dry), headaches and myalgias. Pertinent negatives include no chest pain, fever, nausea or vomiting. Nothing aggravates the symptoms. She has tried nothing for the symptoms. Of note, patient's  was just diagnosed with influenza B. She is breast-feeding. Review of Systems   Constitutional:  Negative for fever. HENT:  Positive for congestion. Respiratory:  Positive for cough (dry). Cardiovascular:  Negative for chest pain. Gastrointestinal:  Negative for nausea and vomiting. Musculoskeletal:  Positive for myalgias. Neurological:  Positive for headaches. Patient-Reported Vitals 2022   Patient-Reported Weight 210lb   Patient-Reported Height 5'0        Physical Exam  Constitutional:       General: She is not in acute distress. Appearance: Normal appearance. She is not ill-appearing or toxic-appearing. Neurological:      Mental Status: She is alert and oriented to person, place, and time.    Psychiatric:         Mood and Affect: Mood normal.         Behavior: Behavior normal.           [place] Reece, was evaluated through a synchronous (real-time) audio-video encounter. The patient (or guardian if applicable) is aware that this is a billable service. Verbal consent to proceed was obtained today. The visit was conducted pursuant to the emergency declaration under the [address] authority and the [employer] Act. Patient identification was verified, and a caregiver was present when appropriate. The patient was located in a state where the provider was credentialed to provide care. This dictation was generated by voice recognition computer software. Although all attempts are made to edit the dictation for accuracy, there may be errors in the transcription that are not intended. An electronic signature was used to authenticate this note.     --Aaron Barkley MD

## 2025-04-08 ENCOUNTER — OFFICE VISIT (OUTPATIENT)
Dept: OBGYN | Age: 40
End: 2025-04-08
Payer: COMMERCIAL

## 2025-04-08 ENCOUNTER — HOSPITAL ENCOUNTER (OUTPATIENT)
Age: 40
Setting detail: SPECIMEN
Discharge: HOME | End: 2025-04-08
Payer: COMMERCIAL

## 2025-04-08 VITALS
BODY MASS INDEX: 43.19 KG/M2 | HEART RATE: 67 BPM | HEIGHT: 60 IN | WEIGHT: 220 LBS | DIASTOLIC BLOOD PRESSURE: 82 MMHG | SYSTOLIC BLOOD PRESSURE: 121 MMHG

## 2025-04-08 DIAGNOSIS — N92.6 MENSTRUAL CHANGES: ICD-10-CM

## 2025-04-08 DIAGNOSIS — Z01.419 WOMEN'S ANNUAL ROUTINE GYNECOLOGICAL EXAMINATION: Primary | ICD-10-CM

## 2025-04-08 DIAGNOSIS — R03.0 ELEVATED BLOOD PRESSURE READING IN OFFICE WITHOUT DIAGNOSIS OF HYPERTENSION: ICD-10-CM

## 2025-04-08 DIAGNOSIS — E66.01 MORBID OBESITY: ICD-10-CM

## 2025-04-08 PROCEDURE — G0123 SCREEN CERV/VAG THIN LAYER: HCPCS

## 2025-04-08 PROCEDURE — 99385 PREV VISIT NEW AGE 18-39: CPT | Performed by: NURSE PRACTITIONER

## 2025-04-08 PROCEDURE — 87624 HPV HI-RISK TYP POOLED RSLT: CPT

## 2025-04-08 SDOH — ECONOMIC STABILITY: FOOD INSECURITY: WITHIN THE PAST 12 MONTHS, THE FOOD YOU BOUGHT JUST DIDN'T LAST AND YOU DIDN'T HAVE MONEY TO GET MORE.: NEVER TRUE

## 2025-04-08 SDOH — ECONOMIC STABILITY: FOOD INSECURITY: WITHIN THE PAST 12 MONTHS, YOU WORRIED THAT YOUR FOOD WOULD RUN OUT BEFORE YOU GOT MONEY TO BUY MORE.: NEVER TRUE

## 2025-04-08 ASSESSMENT — ENCOUNTER SYMPTOMS
DIARRHEA: 0
VOMITING: 0
RESPIRATORY NEGATIVE: 1
CONSTIPATION: 0
SHORTNESS OF BREATH: 0
ABDOMINAL PAIN: 0
NAUSEA: 0
GASTROINTESTINAL NEGATIVE: 1

## 2025-04-08 ASSESSMENT — PATIENT HEALTH QUESTIONNAIRE - PHQ9
SUM OF ALL RESPONSES TO PHQ QUESTIONS 1-9: 0
2. FEELING DOWN, DEPRESSED OR HOPELESS: NOT AT ALL
SUM OF ALL RESPONSES TO PHQ QUESTIONS 1-9: 0
1. LITTLE INTEREST OR PLEASURE IN DOING THINGS: NOT AT ALL

## 2025-04-08 NOTE — PROGRESS NOTES
25    Maryanne Khoury  1985    Chief Complaint   Patient presents with    New Patient     Annual exam. Non-smoker No known h/o dvt.  Patient's last menstrual period was 2025..Periods are regular.Patient is sexually active. Patient  had vasectomy.  Pap Smear was  2020 and results were ASCUS HPV negative. Patient has no complaints          The patient is a 39 y.o. female,  who presents for her annual exam.  She is menstruating normally.  She is  sexually active. She is currently taking birth control.  Birth control method is vasectomy    Pt st's menses have become somewhat heavier and more painful      Pap smear history: Her last PAP smear was in .  Her results were normal per patient.    Past Medical History:   Diagnosis Date    Carpal tunnel syndrome     Menorrhagia     Obesity        Past Surgical History:   Procedure Laterality Date    CARPAL TUNNEL RELEASE Right     CHOLECYSTECTOMY         Family History   Problem Relation Age of Onset    Diabetes type 2  Paternal Grandfather     Heart Disease Paternal Grandfather     Hypertension Father     Heart Disease Father     Heart Disease Mother     Hypertension Mother        Social History     Tobacco Use    Smoking status: Never    Smokeless tobacco: Never   Vaping Use    Vaping status: Never Used   Substance Use Topics    Alcohol use: Yes     Comment: occ    Drug use: Never       No current outpatient medications on file.     No current facility-administered medications for this visit.       No Known Allergies          There is no immunization history on file for this patient.    Review of Systems   Constitutional: Negative.  Negative for fatigue.   Respiratory: Negative.  Negative for shortness of breath.    Gastrointestinal: Negative.  Negative for abdominal pain, constipation, diarrhea, nausea and vomiting.   Genitourinary: Negative.    Neurological:  Negative for dizziness.   Psychiatric/Behavioral: Negative.

## 2025-04-12 LAB
COMMENT: NORMAL
HPV OTHER HR TYPES: NOT DETECTED
HPV TYPE 16: NOT DETECTED
HPV TYPE 18: NOT DETECTED

## 2025-04-14 LAB — GYNECOLOGY CYTOLOGY REPORT: NORMAL
